# Patient Record
Sex: FEMALE | Race: BLACK OR AFRICAN AMERICAN
[De-identification: names, ages, dates, MRNs, and addresses within clinical notes are randomized per-mention and may not be internally consistent; named-entity substitution may affect disease eponyms.]

---

## 2017-04-14 ENCOUNTER — ASOB RESULT (OUTPATIENT)
Age: 37
End: 2017-04-14

## 2017-04-14 ENCOUNTER — APPOINTMENT (OUTPATIENT)
Dept: ANTEPARTUM | Facility: CLINIC | Age: 37
End: 2017-04-14

## 2017-04-14 PROBLEM — Z00.00 ENCOUNTER FOR PREVENTIVE HEALTH EXAMINATION: Status: ACTIVE | Noted: 2017-04-14

## 2017-05-10 ENCOUNTER — APPOINTMENT (OUTPATIENT)
Dept: ANTEPARTUM | Facility: CLINIC | Age: 37
End: 2017-05-10

## 2017-05-10 ENCOUNTER — ASOB RESULT (OUTPATIENT)
Age: 37
End: 2017-05-10

## 2017-05-30 ENCOUNTER — ASOB RESULT (OUTPATIENT)
Age: 37
End: 2017-05-30

## 2017-05-30 ENCOUNTER — APPOINTMENT (OUTPATIENT)
Dept: ANTEPARTUM | Facility: CLINIC | Age: 37
End: 2017-05-30

## 2017-07-07 ENCOUNTER — OUTPATIENT (OUTPATIENT)
Dept: OUTPATIENT SERVICES | Age: 37
LOS: 1 days | Discharge: HOME | End: 2017-07-07

## 2017-07-10 ENCOUNTER — APPOINTMENT (OUTPATIENT)
Dept: PEDIATRIC CARDIOLOGY | Facility: CLINIC | Age: 37
End: 2017-07-10

## 2017-09-05 ENCOUNTER — OUTPATIENT (OUTPATIENT)
Dept: OUTPATIENT SERVICES | Facility: HOSPITAL | Age: 37
LOS: 1 days | End: 2017-09-05

## 2017-09-05 DIAGNOSIS — O30.039 TWIN PREGNANCY, MONOCHORIONIC/DIAMNIOTIC, UNSPECIFIED TRIMESTER: ICD-10-CM

## 2017-09-05 LAB
APPEARANCE UR: SIGNIFICANT CHANGE UP
BACTERIA # UR AUTO: SIGNIFICANT CHANGE UP
BILIRUB UR-MCNC: NEGATIVE — SIGNIFICANT CHANGE UP
BLD GP AB SCN SERPL QL: NEGATIVE — SIGNIFICANT CHANGE UP
BLOOD UR QL VISUAL: NEGATIVE — SIGNIFICANT CHANGE UP
COLOR SPEC: SIGNIFICANT CHANGE UP
GLUCOSE UR-MCNC: NEGATIVE — SIGNIFICANT CHANGE UP
HCT VFR BLD CALC: 34.2 % — LOW (ref 34.5–45)
HGB BLD-MCNC: 11.7 G/DL — SIGNIFICANT CHANGE UP (ref 11.5–15.5)
KETONES UR-MCNC: NEGATIVE — SIGNIFICANT CHANGE UP
LEUKOCYTE ESTERASE UR-ACNC: HIGH
MCHC RBC-ENTMCNC: 33.1 PG — SIGNIFICANT CHANGE UP (ref 27–34)
MCHC RBC-ENTMCNC: 34.2 % — SIGNIFICANT CHANGE UP (ref 32–36)
MCV RBC AUTO: 96.6 FL — SIGNIFICANT CHANGE UP (ref 80–100)
MUCOUS THREADS # UR AUTO: SIGNIFICANT CHANGE UP
NITRITE UR-MCNC: NEGATIVE — SIGNIFICANT CHANGE UP
NRBC # FLD: 0 — SIGNIFICANT CHANGE UP
PH UR: 7 — SIGNIFICANT CHANGE UP (ref 4.6–8)
PLATELET # BLD AUTO: 153 K/UL — SIGNIFICANT CHANGE UP (ref 150–400)
PMV BLD: 11.4 FL — SIGNIFICANT CHANGE UP (ref 7–13)
PROT UR-MCNC: NEGATIVE — SIGNIFICANT CHANGE UP
RBC # BLD: 3.54 M/UL — LOW (ref 3.8–5.2)
RBC # FLD: 13.8 % — SIGNIFICANT CHANGE UP (ref 10.3–14.5)
RBC CASTS # UR COMP ASSIST: SIGNIFICANT CHANGE UP (ref 0–?)
RH IG SCN BLD-IMP: POSITIVE — SIGNIFICANT CHANGE UP
SP GR SPEC: 1 — SIGNIFICANT CHANGE UP (ref 1–1.03)
SQUAMOUS # UR AUTO: SIGNIFICANT CHANGE UP
UROBILINOGEN FLD QL: NORMAL E.U. — SIGNIFICANT CHANGE UP (ref 0.1–0.2)
WBC # BLD: 6.11 K/UL — SIGNIFICANT CHANGE UP (ref 3.8–10.5)
WBC # FLD AUTO: 6.11 K/UL — SIGNIFICANT CHANGE UP (ref 3.8–10.5)
WBC UR QL: HIGH (ref 0–?)

## 2017-09-05 RX ORDER — METOCLOPRAMIDE HCL 10 MG
10 TABLET ORAL ONCE
Qty: 0 | Refills: 0 | Status: COMPLETED | OUTPATIENT
Start: 2017-09-21 | End: 2017-09-21

## 2017-09-05 RX ORDER — SODIUM CHLORIDE 9 MG/ML
1000 INJECTION, SOLUTION INTRAVENOUS ONCE
Qty: 0 | Refills: 0 | Status: COMPLETED | OUTPATIENT
Start: 2017-09-21 | End: 2017-09-21

## 2017-09-06 LAB — T PALLIDUM AB TITR SER: NEGATIVE — SIGNIFICANT CHANGE UP

## 2017-09-10 ENCOUNTER — EMERGENCY (EMERGENCY)
Facility: HOSPITAL | Age: 37
LOS: 1 days | Discharge: STILL ADMITTED OR ADMINISTRATIVE | End: 2017-09-10
Admitting: EMERGENCY MEDICINE

## 2017-09-10 ENCOUNTER — OUTPATIENT (OUTPATIENT)
Dept: OUTPATIENT SERVICES | Facility: HOSPITAL | Age: 37
LOS: 1 days | End: 2017-09-10

## 2017-09-10 VITALS
OXYGEN SATURATION: 100 % | SYSTOLIC BLOOD PRESSURE: 120 MMHG | RESPIRATION RATE: 18 BRPM | TEMPERATURE: 98 F | HEART RATE: 88 BPM | DIASTOLIC BLOOD PRESSURE: 59 MMHG

## 2017-09-10 DIAGNOSIS — O26.899 OTHER SPECIFIED PREGNANCY RELATED CONDITIONS, UNSPECIFIED TRIMESTER: ICD-10-CM

## 2017-09-10 DIAGNOSIS — Z3A.00 WEEKS OF GESTATION OF PREGNANCY NOT SPECIFIED: ICD-10-CM

## 2017-09-10 NOTE — ED ADULT TRIAGE NOTE - CHIEF COMPLAINT QUOTE
34weeks pregnant, , Pt sent in by WILLA Galaviz for low FHR. Pt denies any med complaints at this time.

## 2017-09-14 ENCOUNTER — OUTPATIENT (OUTPATIENT)
Dept: OUTPATIENT SERVICES | Facility: HOSPITAL | Age: 37
LOS: 1 days | End: 2017-09-14

## 2017-09-14 ENCOUNTER — EMERGENCY (EMERGENCY)
Facility: HOSPITAL | Age: 37
LOS: 1 days | Discharge: STILL ADMITTED OR ADMINISTRATIVE | End: 2017-09-14
Admitting: EMERGENCY MEDICINE

## 2017-09-14 VITALS
DIASTOLIC BLOOD PRESSURE: 84 MMHG | RESPIRATION RATE: 16 BRPM | SYSTOLIC BLOOD PRESSURE: 127 MMHG | HEART RATE: 90 BPM | TEMPERATURE: 209 F | OXYGEN SATURATION: 100 %

## 2017-09-14 DIAGNOSIS — O26.899 OTHER SPECIFIED PREGNANCY RELATED CONDITIONS, UNSPECIFIED TRIMESTER: ICD-10-CM

## 2017-09-14 DIAGNOSIS — Z3A.00 WEEKS OF GESTATION OF PREGNANCY NOT SPECIFIED: ICD-10-CM

## 2017-09-14 NOTE — ED ADULT TRIAGE NOTE - CHIEF COMPLAINT QUOTE
Sent from OB for discrepancy in baby's weight. Pt. is 25wks pregnant with twins. Denies any abdominal pain/cramping, vaginal bleeding or any other complaints in triage. L+D called and pt. transferred to unit. Pt. due 10/17.

## 2017-09-15 RX ORDER — SODIUM CHLORIDE 9 MG/ML
1000 INJECTION, SOLUTION INTRAVENOUS
Qty: 0 | Refills: 0 | Status: DISCONTINUED | OUTPATIENT
Start: 2017-09-15 | End: 2017-09-29

## 2017-09-21 ENCOUNTER — RESULT REVIEW (OUTPATIENT)
Age: 37
End: 2017-09-21

## 2017-09-21 ENCOUNTER — INPATIENT (INPATIENT)
Facility: HOSPITAL | Age: 37
LOS: 2 days | Discharge: HOME | End: 2017-09-24
Attending: OBSTETRICS & GYNECOLOGY | Admitting: OBSTETRICS & GYNECOLOGY
Payer: MEDICAID

## 2017-09-21 VITALS — WEIGHT: 182.98 LBS | HEIGHT: 65 IN

## 2017-09-21 DIAGNOSIS — O30.039 TWIN PREGNANCY, MONOCHORIONIC/DIAMNIOTIC, UNSPECIFIED TRIMESTER: ICD-10-CM

## 2017-09-21 LAB
BLD GP AB SCN SERPL QL: NEGATIVE — SIGNIFICANT CHANGE UP
RH IG SCN BLD-IMP: POSITIVE — SIGNIFICANT CHANGE UP

## 2017-09-21 PROCEDURE — 88307 TISSUE EXAM BY PATHOLOGIST: CPT | Mod: 26

## 2017-09-21 RX ORDER — IBUPROFEN 200 MG
600 TABLET ORAL EVERY 6 HOURS
Qty: 0 | Refills: 0 | Status: COMPLETED | OUTPATIENT
Start: 2017-09-21 | End: 2018-08-20

## 2017-09-21 RX ORDER — HYDROMORPHONE HYDROCHLORIDE 2 MG/ML
1 INJECTION INTRAMUSCULAR; INTRAVENOUS; SUBCUTANEOUS EVERY 6 HOURS
Qty: 0 | Refills: 0 | Status: DISCONTINUED | OUTPATIENT
Start: 2017-09-21 | End: 2017-09-22

## 2017-09-21 RX ORDER — HEPARIN SODIUM 5000 [USP'U]/ML
5000 INJECTION INTRAVENOUS; SUBCUTANEOUS EVERY 12 HOURS
Qty: 0 | Refills: 0 | Status: DISCONTINUED | OUTPATIENT
Start: 2017-09-21 | End: 2017-09-24

## 2017-09-21 RX ORDER — DIPHENHYDRAMINE HCL 50 MG
25 CAPSULE ORAL EVERY 6 HOURS
Qty: 0 | Refills: 0 | Status: DISCONTINUED | OUTPATIENT
Start: 2017-09-21 | End: 2017-09-24

## 2017-09-21 RX ORDER — FAMOTIDINE 10 MG/ML
20 INJECTION INTRAVENOUS ONCE
Qty: 0 | Refills: 0 | Status: COMPLETED | OUTPATIENT
Start: 2017-09-21 | End: 2017-09-21

## 2017-09-21 RX ORDER — ONDANSETRON 8 MG/1
4 TABLET, FILM COATED ORAL EVERY 6 HOURS
Qty: 0 | Refills: 0 | Status: DISCONTINUED | OUTPATIENT
Start: 2017-09-21 | End: 2017-09-22

## 2017-09-21 RX ORDER — KETOROLAC TROMETHAMINE 30 MG/ML
30 SYRINGE (ML) INJECTION EVERY 6 HOURS
Qty: 0 | Refills: 0 | Status: DISCONTINUED | OUTPATIENT
Start: 2017-09-21 | End: 2017-09-22

## 2017-09-21 RX ORDER — ACETAMINOPHEN 500 MG
975 TABLET ORAL EVERY 6 HOURS
Qty: 0 | Refills: 0 | Status: DISCONTINUED | OUTPATIENT
Start: 2017-09-21 | End: 2017-09-24

## 2017-09-21 RX ORDER — SODIUM CHLORIDE 9 MG/ML
1000 INJECTION, SOLUTION INTRAVENOUS
Qty: 0 | Refills: 0 | Status: DISCONTINUED | OUTPATIENT
Start: 2017-09-21 | End: 2017-09-22

## 2017-09-21 RX ORDER — MORPHINE SULFATE 50 MG/1
0.15 CAPSULE, EXTENDED RELEASE ORAL ONCE
Qty: 0 | Refills: 0 | Status: DISCONTINUED | OUTPATIENT
Start: 2017-09-21 | End: 2017-09-22

## 2017-09-21 RX ORDER — FERROUS SULFATE 325(65) MG
325 TABLET ORAL DAILY
Qty: 0 | Refills: 0 | Status: DISCONTINUED | OUTPATIENT
Start: 2017-09-21 | End: 2017-09-24

## 2017-09-21 RX ORDER — SODIUM CHLORIDE 9 MG/ML
1000 INJECTION, SOLUTION INTRAVENOUS
Qty: 0 | Refills: 0 | Status: DISCONTINUED | OUTPATIENT
Start: 2017-09-21 | End: 2017-09-21

## 2017-09-21 RX ORDER — CITRIC ACID/SODIUM CITRATE 300-500 MG
30 SOLUTION, ORAL ORAL ONCE
Qty: 0 | Refills: 0 | Status: COMPLETED | OUTPATIENT
Start: 2017-09-21 | End: 2017-09-21

## 2017-09-21 RX ORDER — OXYCODONE HYDROCHLORIDE 5 MG/1
5 TABLET ORAL
Qty: 0 | Refills: 0 | Status: COMPLETED | OUTPATIENT
Start: 2017-09-21 | End: 2017-09-28

## 2017-09-21 RX ORDER — DOCUSATE SODIUM 100 MG
100 CAPSULE ORAL
Qty: 0 | Refills: 0 | Status: DISCONTINUED | OUTPATIENT
Start: 2017-09-21 | End: 2017-09-24

## 2017-09-21 RX ORDER — INFLUENZA VIRUS VACCINE 15; 15; 15; 15 UG/.5ML; UG/.5ML; UG/.5ML; UG/.5ML
0.5 SUSPENSION INTRAMUSCULAR ONCE
Qty: 0 | Refills: 0 | Status: DISCONTINUED | OUTPATIENT
Start: 2017-09-21 | End: 2017-09-24

## 2017-09-21 RX ORDER — OXYTOCIN 10 UNIT/ML
41.67 VIAL (ML) INJECTION
Qty: 20 | Refills: 0 | Status: DISCONTINUED | OUTPATIENT
Start: 2017-09-21 | End: 2017-09-22

## 2017-09-21 RX ORDER — NALOXONE HYDROCHLORIDE 4 MG/.1ML
0.1 SPRAY NASAL
Qty: 0 | Refills: 0 | Status: DISCONTINUED | OUTPATIENT
Start: 2017-09-21 | End: 2017-09-22

## 2017-09-21 RX ORDER — OXYCODONE HYDROCHLORIDE 5 MG/1
10 TABLET ORAL
Qty: 0 | Refills: 0 | Status: DISCONTINUED | OUTPATIENT
Start: 2017-09-21 | End: 2017-09-22

## 2017-09-21 RX ORDER — OXYCODONE HYDROCHLORIDE 5 MG/1
5 TABLET ORAL EVERY 4 HOURS
Qty: 0 | Refills: 0 | Status: COMPLETED | OUTPATIENT
Start: 2017-09-21 | End: 2017-09-28

## 2017-09-21 RX ORDER — OXYTOCIN 10 UNIT/ML
333.33 VIAL (ML) INJECTION
Qty: 20 | Refills: 0 | Status: DISCONTINUED | OUTPATIENT
Start: 2017-09-21 | End: 2017-09-21

## 2017-09-21 RX ORDER — SIMETHICONE 80 MG/1
80 TABLET, CHEWABLE ORAL EVERY 4 HOURS
Qty: 0 | Refills: 0 | Status: DISCONTINUED | OUTPATIENT
Start: 2017-09-21 | End: 2017-09-24

## 2017-09-21 RX ORDER — OXYCODONE HYDROCHLORIDE 5 MG/1
5 TABLET ORAL
Qty: 0 | Refills: 0 | Status: DISCONTINUED | OUTPATIENT
Start: 2017-09-21 | End: 2017-09-22

## 2017-09-21 RX ORDER — LANOLIN
1 OINTMENT (GRAM) TOPICAL
Qty: 0 | Refills: 0 | Status: DISCONTINUED | OUTPATIENT
Start: 2017-09-21 | End: 2017-09-24

## 2017-09-21 RX ORDER — TETANUS TOXOID, REDUCED DIPHTHERIA TOXOID AND ACELLULAR PERTUSSIS VACCINE, ADSORBED 5; 2.5; 8; 8; 2.5 [IU]/.5ML; [IU]/.5ML; UG/.5ML; UG/.5ML; UG/.5ML
0.5 SUSPENSION INTRAMUSCULAR ONCE
Qty: 0 | Refills: 0 | Status: DISCONTINUED | OUTPATIENT
Start: 2017-09-21 | End: 2017-09-24

## 2017-09-21 RX ORDER — GLYCERIN ADULT
1 SUPPOSITORY, RECTAL RECTAL AT BEDTIME
Qty: 0 | Refills: 0 | Status: DISCONTINUED | OUTPATIENT
Start: 2017-09-21 | End: 2017-09-24

## 2017-09-21 RX ADMIN — Medication 10 MILLIGRAM(S): at 09:36

## 2017-09-21 RX ADMIN — HEPARIN SODIUM 5000 UNIT(S): 5000 INJECTION INTRAVENOUS; SUBCUTANEOUS at 18:24

## 2017-09-21 RX ADMIN — SODIUM CHLORIDE 2000 MILLILITER(S): 9 INJECTION, SOLUTION INTRAVENOUS at 09:37

## 2017-09-21 RX ADMIN — Medication 125 MILLIUNIT(S)/MIN: at 13:16

## 2017-09-21 RX ADMIN — ONDANSETRON 4 MILLIGRAM(S): 8 TABLET, FILM COATED ORAL at 18:24

## 2017-09-21 RX ADMIN — Medication 30 MILLILITER(S): at 09:37

## 2017-09-21 RX ADMIN — FAMOTIDINE 20 MILLIGRAM(S): 10 INJECTION INTRAVENOUS at 09:36

## 2017-09-21 RX ADMIN — Medication 30 MILLIGRAM(S): at 18:24

## 2017-09-21 NOTE — DISCHARGE NOTE OB - CARE PROVIDER_API CALL
Jennifer Salvador (DO), Gynecology Obstetrics  Gynecology  [address] [place] [address]  Phone: [phone]  Fax: [phone]

## 2017-09-21 NOTE — DISCHARGE NOTE OB - HOSPITAL COURSE
Scheduled repeat C/S for mono-di twin gestation   Viable female infant apgars 9/9  Viable female infant apgars 9/9

## 2017-09-21 NOTE — DISCHARGE NOTE OB - CARE PLAN
Principal Discharge DX:	 delivery delivered  Goal:	Routine  Instructions for follow-up, activity and diet:	Regular diet; Resume activity as tolerated

## 2017-09-21 NOTE — DISCHARGE NOTE OB - PATIENT PORTAL LINK FT
“You can access the Follow[employer] Patient Portal, offered by [facility], by registering with the following website: [email or web address]”

## 2017-09-22 LAB
BASOPHILS # BLD AUTO: 0.04 K/UL — SIGNIFICANT CHANGE UP (ref 0–0.2)
BASOPHILS NFR BLD AUTO: 0.5 % — SIGNIFICANT CHANGE UP (ref 0–2)
EOSINOPHIL # BLD AUTO: 0.07 K/UL — SIGNIFICANT CHANGE UP (ref 0–0.5)
EOSINOPHIL NFR BLD AUTO: 0.9 % — SIGNIFICANT CHANGE UP (ref 0–6)
HCT VFR BLD CALC: 33.2 % — LOW (ref 34.5–45)
HGB BLD-MCNC: 11.3 G/DL — LOW (ref 11.5–15.5)
IMM GRANULOCYTES # BLD AUTO: 0.06 # — SIGNIFICANT CHANGE UP
IMM GRANULOCYTES NFR BLD AUTO: 0.8 % — SIGNIFICANT CHANGE UP (ref 0–1.5)
LYMPHOCYTES # BLD AUTO: 1.39 K/UL — SIGNIFICANT CHANGE UP (ref 1–3.3)
LYMPHOCYTES # BLD AUTO: 17.7 % — SIGNIFICANT CHANGE UP (ref 13–44)
MCHC RBC-ENTMCNC: 32.8 PG — SIGNIFICANT CHANGE UP (ref 27–34)
MCHC RBC-ENTMCNC: 34 % — SIGNIFICANT CHANGE UP (ref 32–36)
MCV RBC AUTO: 96.2 FL — SIGNIFICANT CHANGE UP (ref 80–100)
MONOCYTES # BLD AUTO: 0.64 K/UL — SIGNIFICANT CHANGE UP (ref 0–0.9)
MONOCYTES NFR BLD AUTO: 8.1 % — SIGNIFICANT CHANGE UP (ref 2–14)
NEUTROPHILS # BLD AUTO: 5.67 K/UL — SIGNIFICANT CHANGE UP (ref 1.8–7.4)
NEUTROPHILS NFR BLD AUTO: 72 % — SIGNIFICANT CHANGE UP (ref 43–77)
NRBC # FLD: 0 — SIGNIFICANT CHANGE UP
PLATELET # BLD AUTO: 134 K/UL — LOW (ref 150–400)
PMV BLD: 11.1 FL — SIGNIFICANT CHANGE UP (ref 7–13)
RBC # BLD: 3.45 M/UL — LOW (ref 3.8–5.2)
RBC # FLD: 13.4 % — SIGNIFICANT CHANGE UP (ref 10.3–14.5)
WBC # BLD: 7.87 K/UL — SIGNIFICANT CHANGE UP (ref 3.8–10.5)
WBC # FLD AUTO: 7.87 K/UL — SIGNIFICANT CHANGE UP (ref 3.8–10.5)

## 2017-09-22 RX ORDER — MAGNESIUM HYDROXIDE 400 MG/1
30 TABLET, CHEWABLE ORAL DAILY
Qty: 0 | Refills: 0 | Status: DISCONTINUED | OUTPATIENT
Start: 2017-09-22 | End: 2017-09-24

## 2017-09-22 RX ORDER — IBUPROFEN 200 MG
600 TABLET ORAL EVERY 6 HOURS
Qty: 0 | Refills: 0 | Status: DISCONTINUED | OUTPATIENT
Start: 2017-09-22 | End: 2017-09-24

## 2017-09-22 RX ADMIN — Medication 1 TABLET(S): at 12:56

## 2017-09-22 RX ADMIN — Medication 975 MILLIGRAM(S): at 06:15

## 2017-09-22 RX ADMIN — Medication 975 MILLIGRAM(S): at 21:32

## 2017-09-22 RX ADMIN — Medication 325 MILLIGRAM(S): at 12:56

## 2017-09-22 RX ADMIN — Medication 30 MILLIGRAM(S): at 06:15

## 2017-09-22 RX ADMIN — MAGNESIUM HYDROXIDE 30 MILLILITER(S): 400 TABLET, CHEWABLE ORAL at 17:20

## 2017-09-22 RX ADMIN — Medication 975 MILLIGRAM(S): at 12:56

## 2017-09-22 RX ADMIN — Medication 975 MILLIGRAM(S): at 22:00

## 2017-09-22 RX ADMIN — HEPARIN SODIUM 5000 UNIT(S): 5000 INJECTION INTRAVENOUS; SUBCUTANEOUS at 05:45

## 2017-09-22 RX ADMIN — Medication 600 MILLIGRAM(S): at 21:32

## 2017-09-22 RX ADMIN — Medication 100 MILLIGRAM(S): at 12:56

## 2017-09-22 RX ADMIN — HEPARIN SODIUM 5000 UNIT(S): 5000 INJECTION INTRAVENOUS; SUBCUTANEOUS at 17:34

## 2017-09-22 RX ADMIN — Medication 30 MILLIGRAM(S): at 05:46

## 2017-09-22 RX ADMIN — SIMETHICONE 80 MILLIGRAM(S): 80 TABLET, CHEWABLE ORAL at 12:56

## 2017-09-22 RX ADMIN — Medication 600 MILLIGRAM(S): at 22:00

## 2017-09-22 RX ADMIN — Medication 25 MILLIGRAM(S): at 05:46

## 2017-09-22 RX ADMIN — Medication 975 MILLIGRAM(S): at 13:26

## 2017-09-22 RX ADMIN — Medication 975 MILLIGRAM(S): at 05:45

## 2017-09-22 NOTE — PROGRESS NOTE ADULT - PROBLEM SELECTOR PLAN 1
-Encourage Ambulation  -Continue with regular diet  -Heparin, SCDs, and ambulation for DVT ppx  -Discontinue grady  -Analgesia as needed

## 2017-09-23 ENCOUNTER — TRANSCRIPTION ENCOUNTER (OUTPATIENT)
Age: 37
End: 2017-09-23

## 2017-09-23 RX ORDER — OXYCODONE HYDROCHLORIDE 5 MG/1
5 TABLET ORAL EVERY 4 HOURS
Qty: 0 | Refills: 0 | Status: DISCONTINUED | OUTPATIENT
Start: 2017-09-23 | End: 2017-09-24

## 2017-09-23 RX ORDER — OXYCODONE HYDROCHLORIDE 5 MG/1
5 TABLET ORAL
Qty: 0 | Refills: 0 | Status: DISCONTINUED | OUTPATIENT
Start: 2017-09-23 | End: 2017-09-24

## 2017-09-23 RX ADMIN — Medication 600 MILLIGRAM(S): at 05:35

## 2017-09-23 RX ADMIN — Medication 600 MILLIGRAM(S): at 12:26

## 2017-09-23 RX ADMIN — Medication 975 MILLIGRAM(S): at 13:16

## 2017-09-23 RX ADMIN — HEPARIN SODIUM 5000 UNIT(S): 5000 INJECTION INTRAVENOUS; SUBCUTANEOUS at 18:46

## 2017-09-23 RX ADMIN — Medication 975 MILLIGRAM(S): at 19:03

## 2017-09-23 RX ADMIN — Medication 600 MILLIGRAM(S): at 06:05

## 2017-09-23 RX ADMIN — OXYCODONE HYDROCHLORIDE 5 MILLIGRAM(S): 5 TABLET ORAL at 16:10

## 2017-09-23 RX ADMIN — Medication 600 MILLIGRAM(S): at 19:03

## 2017-09-23 RX ADMIN — Medication 100 MILLIGRAM(S): at 12:26

## 2017-09-23 RX ADMIN — SIMETHICONE 80 MILLIGRAM(S): 80 TABLET, CHEWABLE ORAL at 15:33

## 2017-09-23 RX ADMIN — OXYCODONE HYDROCHLORIDE 5 MILLIGRAM(S): 5 TABLET ORAL at 15:29

## 2017-09-23 RX ADMIN — Medication 325 MILLIGRAM(S): at 12:26

## 2017-09-23 RX ADMIN — SIMETHICONE 80 MILLIGRAM(S): 80 TABLET, CHEWABLE ORAL at 18:47

## 2017-09-23 RX ADMIN — Medication 975 MILLIGRAM(S): at 05:35

## 2017-09-23 RX ADMIN — Medication 975 MILLIGRAM(S): at 18:47

## 2017-09-23 RX ADMIN — Medication 975 MILLIGRAM(S): at 06:05

## 2017-09-23 RX ADMIN — Medication 600 MILLIGRAM(S): at 13:16

## 2017-09-23 RX ADMIN — Medication 600 MILLIGRAM(S): at 18:47

## 2017-09-23 RX ADMIN — Medication 1 TABLET(S): at 12:26

## 2017-09-23 RX ADMIN — Medication 975 MILLIGRAM(S): at 12:26

## 2017-09-23 RX ADMIN — SIMETHICONE 80 MILLIGRAM(S): 80 TABLET, CHEWABLE ORAL at 11:19

## 2017-09-23 RX ADMIN — HEPARIN SODIUM 5000 UNIT(S): 5000 INJECTION INTRAVENOUS; SUBCUTANEOUS at 05:35

## 2017-09-23 NOTE — PROGRESS NOTE ADULT - PROBLEM SELECTOR PLAN 1
- Continue regular diet.  - Increase ambulation.  - Continue motrin, tylenol, oxycodone PRN for pain control. vs. D/c PCEA today and transition to PO pain medication with motrin, tylenol and oxycodone PRN.     Sherwin Bender PGY-1

## 2017-09-24 VITALS
DIASTOLIC BLOOD PRESSURE: 76 MMHG | SYSTOLIC BLOOD PRESSURE: 130 MMHG | HEART RATE: 64 BPM | OXYGEN SATURATION: 100 % | RESPIRATION RATE: 18 BRPM | TEMPERATURE: 98 F

## 2017-09-24 RX ADMIN — HEPARIN SODIUM 5000 UNIT(S): 5000 INJECTION INTRAVENOUS; SUBCUTANEOUS at 06:01

## 2017-09-24 RX ADMIN — Medication 1 TABLET(S): at 12:59

## 2017-09-24 RX ADMIN — Medication 325 MILLIGRAM(S): at 12:59

## 2017-09-24 RX ADMIN — Medication 975 MILLIGRAM(S): at 00:21

## 2017-09-24 RX ADMIN — Medication 975 MILLIGRAM(S): at 01:30

## 2017-09-24 RX ADMIN — Medication 975 MILLIGRAM(S): at 06:01

## 2017-09-24 RX ADMIN — Medication 600 MILLIGRAM(S): at 01:30

## 2017-09-24 RX ADMIN — Medication 600 MILLIGRAM(S): at 07:30

## 2017-09-24 RX ADMIN — Medication 600 MILLIGRAM(S): at 00:21

## 2017-09-24 RX ADMIN — Medication 600 MILLIGRAM(S): at 06:01

## 2017-09-24 RX ADMIN — Medication 975 MILLIGRAM(S): at 07:30

## 2017-09-24 NOTE — PROGRESS NOTE ADULT - PROBLEM SELECTOR PLAN 1
-Encourage ambulation  -Check CBC  -Oral pain meds for pain control  -HSQ for DVT ppx  -Advance diet as tolerated    Soni Huff PGY-2

## 2017-09-24 NOTE — PROGRESS NOTE ADULT - SUBJECTIVE AND OBJECTIVE BOX
Day ___1 of Anesthesia Pain Management Service    SUBJECTIVE:  Pain Scale Score	At rest: __none_ 	With Activity: __mild_ 	[x ] Refer to charted pain scores    THERAPY:    s/p _____0.2___ mg PF morphine on _____9/2117_    OBJECTIVE:    Sedation Score:	[ x] Alert	[ ] Drowsy	[ ] Arousable	[ ] Asleep	[ ] Unresponsive    Side Effects:	[x ] None	[ ] Nausea	[ ] Vomiting	[ ] Pruritus  		  [ ] Weakness		[ ] Numbness	[ ] Other:    Vital Signs Last 24 Hrs  T(C): 36.2 (22 Sep 2017 05:51), Max: 37.2 (21 Sep 2017 21:51)  T(F): 97.2 (22 Sep 2017 05:51), Max: 99 (21 Sep 2017 21:51)  HR: 66 (22 Sep 2017 05:51) (58 - 95)  BP: 119/52 (22 Sep 2017 05:51) (92/71 - 135/54)  BP(mean): 71 (21 Sep 2017 15:00) (63 - 79)  RR: 17 (22 Sep 2017 05:51) (17 - 26)  SpO2: 99% (22 Sep 2017 05:51) (96% - 100%)    ASSESSMENT/ PLAN  [x ] Patient transitioned to prn analgesics  [ x] Pain management per primary service, pain service to sign off   [ x]Documentation and Verification of current medications     Comments: No anesthetic complications.
OB Progress Note: LTCS, POD#2    S: 37yo POD#2 s/p LTCS. Pain is well controlled. She is tolerating a regular diet and passing flatus. She is voiding spontaneously, and ambulating without difficulty. Denies CP/SOB. Denies lightheadedness/dizziness. Denies N/V.    O:  Vitals:  Vital Signs Last 24 Hrs  T(C): 36.7 (23 Sep 2017 06:50), Max: 37 (22 Sep 2017 10:45)  T(F): 98.1 (23 Sep 2017 06:50), Max: 98.6 (22 Sep 2017 10:45)  HR: 69 (23 Sep 2017 06:50) (69 - 81)  BP: 127/71 (23 Sep 2017 06:50) (119/62 - 128/70)  BP(mean): --  RR: 18 (23 Sep 2017 06:50) (18 - 18)  SpO2: 100% (23 Sep 2017 06:50) (99% - 100%)    MEDICATIONS  (STANDING):  influenza   Vaccine 0.5 milliLiter(s) IntraMuscular once  acetaminophen   Tablet. 975 milliGRAM(s) Oral every 6 hours  diphtheria/tetanus/pertussis (acellular) Vaccine (ADAcel) 0.5 milliLiter(s) IntraMuscular once  ferrous    sulfate 325 milliGRAM(s) Oral daily  prenatal multivitamin 1 Tablet(s) Oral daily  heparin  Injectable 5000 Unit(s) SubCutaneous every 12 hours  ibuprofen  Tablet 600 milliGRAM(s) Oral every 6 hours  oxyCODONE    IR 5 milliGRAM(s) Oral every 3 hours      MEDICATIONS  (PRN):  simethicone 80 milliGRAM(s) Chew every 4 hours PRN Gas  diphenhydrAMINE   Capsule 25 milliGRAM(s) Oral every 6 hours PRN Itching  glycerin Suppository - Adult 1 Suppository(s) Rectal at bedtime PRN Constipation  docusate sodium 100 milliGRAM(s) Oral two times a day PRN Stool Softening  lanolin Ointment 1 Application(s) Topical every 3 hours PRN Sore Nipples  magnesium hydroxide Suspension 30 milliLiter(s) Oral daily PRN Constipation  oxyCODONE    IR 5 milliGRAM(s) Oral every 4 hours PRN Severe Pain (7 - 10)      Labs:  Blood type: O Positive  Rubella IgG: RPR: Negative                          11.3<L>   7.87 >-----------< 134<L>    ( 09-22 @ 06:15 )             33.2<L>            PE:  General: NAD  Abdomen: Soft, appropriately tender, incision c/d/i.  Extremities: No erythema, no pitting edema
Post-Operative Note, C/S  She is a  36y woman who is now post-operative day:     Subjective:  The patient feels well.  She is ambulating.   She is tolerating regular diet.  She denies nausea and vomiting; denies fever.  She is voiding.  Her pain is controlled; incisional pain is appropriate.  She reports normal postpartum bleeding.  She is breastfeeding.  She is formula feeding.    Physical exam:    Vital Signs Last 24 Hrs  T(C): 37 (22 Sep 2017 10:45), Max: 37.2 (21 Sep 2017 21:51)  T(F): 98.6 (22 Sep 2017 10:45), Max: 99 (21 Sep 2017 21:51)  HR: 69 (22 Sep 2017 10:45) (58 - 75)  BP: 119/62 (22 Sep 2017 10:45) (104/52 - 135/54)  BP(mean): 71 (21 Sep 2017 15:00) (64 - 79)  RR: 18 (22 Sep 2017 10:45) (17 - 21)  SpO2: 99% (22 Sep 2017 10:45) (96% - 100%)    Gen: NAD  Breast: Soft, nontender, not engorged.  Abdomen: Soft, nontender, no distension , firm uterine fundus at umbilicus.  Incision: C/D/I.  Pelvic: Normal lochia noted  Ext: No calf tenderness    LABS:                        11.3   7.87  )-----------( 134      ( 22 Sep 2017 06:15 )             33.2       Rubella status:     Allergies    No Known Drug Allergies  peanuts (Anaphylaxis)    Intolerances      MEDICATIONS  (STANDING):  influenza   Vaccine 0.5 milliLiter(s) IntraMuscular once  acetaminophen   Tablet. 975 milliGRAM(s) Oral every 6 hours  ibuprofen  Tablet 600 milliGRAM(s) Oral every 6 hours  oxyCODONE    IR 5 milliGRAM(s) Oral every 3 hours  lactated ringers. 1000 milliLiter(s) (125 mL/Hr) IV Continuous <Continuous>  diphtheria/tetanus/pertussis (acellular) Vaccine (ADAcel) 0.5 milliLiter(s) IntraMuscular once  ferrous    sulfate 325 milliGRAM(s) Oral daily  prenatal multivitamin 1 Tablet(s) Oral daily  heparin  Injectable 5000 Unit(s) SubCutaneous every 12 hours    MEDICATIONS  (PRN):  oxyCODONE    IR 5 milliGRAM(s) Oral every 3 hours PRN Mild Pain  oxyCODONE    IR 10 milliGRAM(s) Oral every 3 hours PRN Moderate Pain  HYDROmorphone  Injectable 1 milliGRAM(s) IV Push every 6 hours PRN Severe Pain  naloxone Injectable 0.1 milliGRAM(s) IV Push every 3 minutes PRN For ANY of the following changes in patient status:  A. RR LESS THAN 10 breaths per minute, B. Oxygen saturation LESS THAN 90%, C. Sedation score of 6  ondansetron Injectable 4 milliGRAM(s) IV Push every 6 hours PRN Nausea  oxyCODONE    IR 5 milliGRAM(s) Oral every 4 hours PRN Severe Pain (7 - 10)  simethicone 80 milliGRAM(s) Chew every 4 hours PRN Gas  diphenhydrAMINE   Capsule 25 milliGRAM(s) Oral every 6 hours PRN Itching  glycerin Suppository - Adult 1 Suppository(s) Rectal at bedtime PRN Constipation  docusate sodium 100 milliGRAM(s) Oral two times a day PRN Stool Softening  lanolin Ointment 1 Application(s) Topical every 3 hours PRN Sore Nipples        Assessment and Plan  POD #1 s/p C/S.  Doing well.  Encourage ambulation.  Incisional care and PO instructions reviewed.  CPC.
Post-Operative Note, C/S  She is a  36y woman who is now post-operative day: 2    Subjective:  The patient feels well.  She is ambulating.   She is tolerating regular diet.  She denies nausea and vomiting; denies fever.  She is voiding.  Her pain is controlled; incisional pain is appropriate.  She reports normal postpartum bleeding.      Physical exam:    Vital Signs Last 24 Hrs  T(C): 36.7 (23 Sep 2017 06:50), Max: 37 (22 Sep 2017 22:03)  T(F): 98.1 (23 Sep 2017 06:50), Max: 98.6 (22 Sep 2017 22:03)  HR: 69 (23 Sep 2017 06:50) (69 - 81)  BP: 127/71 (23 Sep 2017 06:50) (122/68 - 128/70)  BP(mean): --  RR: 18 (23 Sep 2017 06:50) (18 - 18)  SpO2: 100% (23 Sep 2017 06:50) (99% - 100%)    Gen: NAD  Breast: Soft, nontender, not engorged.  Abdomen: Soft, nontender, no distension , firm uterine fundus at umbilicus.  Incision: C/D/I.  Pelvic: Normal lochia noted  Ext: No calf tenderness    LABS:                        11.3   7.87  )-----------( 134      ( 22 Sep 2017 06:15 )             33.2           Allergies    No Known Drug Allergies  peanuts (Anaphylaxis)      MEDICATIONS  (STANDING):  influenza   Vaccine 0.5 milliLiter(s) IntraMuscular once  acetaminophen   Tablet. 975 milliGRAM(s) Oral every 6 hours  diphtheria/tetanus/pertussis (acellular) Vaccine (ADAcel) 0.5 milliLiter(s) IntraMuscular once  ferrous    sulfate 325 milliGRAM(s) Oral daily  prenatal multivitamin 1 Tablet(s) Oral daily  heparin  Injectable 5000 Unit(s) SubCutaneous every 12 hours  ibuprofen  Tablet 600 milliGRAM(s) Oral every 6 hours  oxyCODONE    IR 5 milliGRAM(s) Oral every 3 hours    MEDICATIONS  (PRN):  simethicone 80 milliGRAM(s) Chew every 4 hours PRN Gas  diphenhydrAMINE   Capsule 25 milliGRAM(s) Oral every 6 hours PRN Itching  glycerin Suppository - Adult 1 Suppository(s) Rectal at bedtime PRN Constipation  docusate sodium 100 milliGRAM(s) Oral two times a day PRN Stool Softening  lanolin Ointment 1 Application(s) Topical every 3 hours PRN Sore Nipples  magnesium hydroxide Suspension 30 milliLiter(s) Oral daily PRN Constipation  oxyCODONE    IR 5 milliGRAM(s) Oral every 4 hours PRN Severe Pain (7 - 10)        Assessment and Plan  POD #2 s/p C/S.  Doing well.  Encourage ambulation.  Incisional care and PO instructions reviewed.  CPC.
Postop Day  __1_ s/p   C- Section    THERAPY:  [ x ] Spinal morphine   [  ] Epidural morphine   [  ] IV PCA Hydromorphone 1 mg/ml      Sedation Score:	  [ x ] Alert	    [  ] Drowsy        [  ] Arousable	[  ] Asleep	[  ] Unresponsive    Side Effects:	  [ x ] None	     [  ] Nausea        [  ] Pruritus        [  ] Weakness   [  ] Numbness        ASSESSMENT/ PLAN   [   ] Discontinue         [  ] Continue  [ x ]Documentation and Verification of current medications     Comments: Transitioned to prn oral analgesics by primary team. No anesthetic complication.
Postpartum Note,  Section   36y year old woman post-operative day 1 from uncomplicated repeat LTCS.  No acute events overnight.  Patient has no complaints and pain is well-controlled.  Patient is tolerating regular diet, passing flatus, ambulating, has a grady catheter in place.  Postpartum bleeding is well controlled.    Physical exam:    Vital Signs Last 24 Hrs  T(C): 36.2 (22 Sep 2017 05:51), Max: 37.2 (21 Sep 2017 21:51)  T(F): 97.2 (22 Sep 2017 05:51), Max: 99 (21 Sep 2017 21:51)  HR: 66 (22 Sep 2017 05:51) (58 - 95)  BP: 119/52 (22 Sep 2017 05:51) (92/71 - 135/54)  BP(mean): 71 (21 Sep 2017 15:00) (63 - 79)  RR: 17 (22 Sep 2017 05:51) (17 - 26)  SpO2: 99% (22 Sep 2017 05:51) (96% - 100%)     @ 07:01  -   @ 07:00  --------------------------------------------------------  IN: 2975 mL / OUT: 2835 mL / NET: 140 mL        Gen: NAD  Abdomen: Soft, nontender, no distension , firm uterine fundus at umbilicus.  Incision: Clean, dry, and intact with steri strips  Pelvic: Normal lochia noted  Ext: No calf tenderness    LABS:                        11.3   7.87  )-----------( 134      ( 22 Sep 2017 06:15 )             33.2
Subjective:  The patient feels well. She is ambulating.  She is tolerating regular diet. She denies nausea and vomiting. She is voiding. Her pain is controlled. She reports normal postpartum bleeding. She is breastfeeding.    Objective:    Vital Signs Last 24 Hrs  T(C): 36.5 (24 Sep 2017 06:09), Max: 36.7 (23 Sep 2017 21:44)  T(F): 97.7 (24 Sep 2017 06:09), Max: 98.1 (23 Sep 2017 21:44)  HR: 64 (24 Sep 2017 06:09) (64 - 74)  BP: 130/76 (24 Sep 2017 06:09) (115/55 - 130/76)  BP(mean): --  RR: 18 (24 Sep 2017 06:09) (18 - 18)  SpO2: 100% (24 Sep 2017 06:09) (99% - 100%)        Physical exam:  Gen: NAD  Abdomen: Soft, nontender, no distension. Firm uterine fundus at umbilicus.  Incision: Clean, dry, and intact   Pelvic: Deferred  Ext: No calf tenderness or edema bilaterally          MEDICATIONS  (STANDING):  influenza   Vaccine 0.5 milliLiter(s) IntraMuscular once  acetaminophen   Tablet. 975 milliGRAM(s) Oral every 6 hours  diphtheria/tetanus/pertussis (acellular) Vaccine (ADAcel) 0.5 milliLiter(s) IntraMuscular once  ferrous    sulfate 325 milliGRAM(s) Oral daily  prenatal multivitamin 1 Tablet(s) Oral daily  heparin  Injectable 5000 Unit(s) SubCutaneous every 12 hours  ibuprofen  Tablet 600 milliGRAM(s) Oral every 6 hours  oxyCODONE    IR 5 milliGRAM(s) Oral every 3 hours    MEDICATIONS  (PRN):  simethicone 80 milliGRAM(s) Chew every 4 hours PRN Gas  diphenhydrAMINE   Capsule 25 milliGRAM(s) Oral every 6 hours PRN Itching  glycerin Suppository - Adult 1 Suppository(s) Rectal at bedtime PRN Constipation  docusate sodium 100 milliGRAM(s) Oral two times a day PRN Stool Softening  lanolin Ointment 1 Application(s) Topical every 3 hours PRN Sore Nipples  magnesium hydroxide Suspension 30 milliLiter(s) Oral daily PRN Constipation  oxyCODONE    IR 5 milliGRAM(s) Oral every 4 hours PRN Severe Pain (7 - 10)

## 2017-10-01 LAB — SURGICAL PATHOLOGY STUDY: SIGNIFICANT CHANGE UP

## 2022-04-06 NOTE — ED ADULT NURSE NOTE - NS ED NURSE NOTE DISPO AOU DETAILS FT
OBSTETRIC HISTORY AND PHYSICAL EXAM      PRIMARY CERTIFIED NURSE MIDWIFE:  Nikole Schaefer, CPM, LM  ADMITTING ATTENDING:  Saman Jansen MD  ADMITTING CERTIFIED NURSE MIDWIFE: Nikole Grey CNM    Chief Complaint   Patient presents with   • Scheduled Induction     HISTORY OF PRESENT ILLNESS:  Fernanda Potter is a 30 year old White  female, , estimated date of delivery 3/25/2022, by Ultrasound @ 41w5d who presents for scheduled IOL for post-EDC and oligohydramnios.  There is no prenatal record to review.   Reports:  no complaints.  Fetal movement present.   Pregnancy is significant for routine prenatal care, oligohydramnios and post-EDC.    Active Hospital Problems    Diagnosis    • Encounter for induction of labor    • Supervision of other normal pregnancy, antepartum      Bridge patient- Nikole Schaefer  GBS unknown- declined testing       OB History    Para Term  AB Living   3 1 1   1 1   SAB IAB Ectopic Molar Multiple Live Births             1      # Outcome Date GA Lbr Thomas/2nd Weight Sex Delivery Anes PTL Lv   3 Current            2 AB 21 8w0d    Miscar      1 Term 2017 41w5d  3600 g F Vag-Spont EPI  LOCO      Birth Comments: IOL for post EDC, intramnionitc infection- baby NICU for 48 hours.  GBS negative.       CURRENT PREGNANCY:   Past Medical History:   Diagnosis Date   • Seasonal allergies      Past Surgical History:   Procedure Laterality Date   • Colonoscopy      diagnosed with colitis   • Iud removal  2020    Mirena   • Tonsillectomy and adenoidectomy       I have reviewed the patient's medications and allergies, past medical, surgical, social and family history, updating these as appropriate.  See Histories section of the electronic medical record for a display of this information.     GYNECOLOGIC HISTORY:  Fernanda Potter has no history of sexually transmitted diseases, Denies all sexually transmitted disease's.  She has no history of abnormal Pap smears.       SOCIAL  HISTORY:  Social History     Tobacco Use   • Smoking status: Never Smoker   • Smokeless tobacco: Never Used   Substance Use Topics   • Alcohol use: Yes       Sexually Active: Yes             Partners with: Male       Birth Control/Protection: None      Drug Use:    Never           Review of patient's social economics indicates:                    FAMILY HISTORY:  Family History   Problem Relation Age of Onset   • Bipolar disorder Mother    • Diabetes Mother    • Hypertension Mother    • Other Father         doesn't know her biological father   • Asthma Sister    • Other Sister         Marfan's   • Diabetes Maternal Grandmother    • Other Maternal Grandfather          in his 50s   • Kidney disease Maternal Great-Grandmother    • COPD Maternal Great-Grandmother    • Patient is unaware of any medical problems Daughter        ACTIVE MEDICATIONS:  Outpatient Medications Marked as Taking for the 22 encounter (Hospital Encounter)   Medication Sig Dispense Refill   • Prenatal Vit-Fe Fumarate-FA (PRENATAL VITAMIN PO)          ALLERGIES:  ALLERGIES:  Patient has no known allergies.    REVIEW OF SYSTEMS:    Eye problem(s):  negative  ENT problem(s):  negative  Cardiovascular problem(s):  negative  Respiratory problem(s):  negative  Gastrointestinal problem(s):  negative     Genitourinary problem(s):  negative  Musculoskeletal problem(s):  negative  Integumentary problem(s):  negative  Neurological problem(s):  negative  Psychiatric problem(s):  negative  Endocrine problem(s):  negative  Hematologic and/or Lymphatic problem(s):  negative    PHYSICAL EXAM  Visit Vitals  Temp 98.6 °F (37 °C) (Oral)   Ht 5' 7\" (1.702 m)   BMI 31.58 kg/m²       General:   alert, appears stated age and cooperative,  female   Psychiatric:  Appropriate mood and affect   Skin:   normal   HEENT:  pupils equal, round and reactive to light   Lungs:   Respirations unlabored, clear to auscultation bilaterally   Heart:   regular rate  and rhythm, S1, S2 normal, no murmur, click, rub or gallop   Abdomen: Gravid, nontender, soft, no masses   Extremities Normal   Fetal Surveillance/  Tocodynamometer: Fetal Heart Rate  Mode:   External  Baseline:   140  Classification:   Category I  Variability:   Moderate  Pattern:   Accelerations    Uterine Activity  Mode:   [place]  Frequency:   Irregular  Duration:     Quality:   Mild  Resting Tone:  Soft     Uterine Size: size equals dates   Presentation: cephalic by exam     Sterile Vaginal Exam:    Dilation 3 (22) cm   Effacement 60 (22)%   Station -1 (22)   Consistency Soft (22)   Position  Presentation             PRENATAL LABS:  Blood type:  Ordered    No results for input(s): GCPT, CHPT, HGB, HCT, PLT, HBAG, HIV, RUBEL, SYPIGG, RPR, HSVM, HSV1X, HSV2X, HRTYG2, HSV1R, HSVPRB, REPCS, CULT, GBS in the last 8765 hours.    Invalid input(s): SPYGM, HSV1GR, HSVPCR  No results found for this or any previous visit. No results found for this or any previous visit.    Most Recent Pap Smear:  No results found for: THINHPVRPT  First Trimester/Quad Screen:  Not collected  One-hour glucose tolerance test:  Normal- per patient's report    No results found for this or any previous visit.    IMAGING:  Please see imaging tab for full ultrasound reports  : anatomy:  Normal.  placenta:  Posterior.  MVP:  5.25 AC:  73%. EFW:  81%.  : Cephalic.  MVP: 1.74.  BPP: 8/8.  EFW 4144 gm.    ASSESSMENT/PLAN:  Fernanda Potter is a 30 year old  female at 41w5d here for scheduled IOL.    Fetal Heart Rate Interpretation:   Category I  Estimated Fetal Weight:  9 lb  Postpartum Hemorrhage Risk:  Low (22)    Patient Active Problem List   Diagnosis   • Seasonal allergies   • Depression with anxiety   • Supervision of other normal pregnancy, antepartum   • Encounter for induction of labor          1. Admit to L&D.  2. CBC, Type&Screen.  3. Rapid Covid-19.  4. CMP, Uric Acid, Spot  Urine.   5. IV, LR at 125 cc/hr.  6. Clear liquid diet.   7. Continuous fetal monitoring.   8. Hydrotherapy, Nubain and/or epidural if desired.  9. Pitocin per protocol.  10. Obtain prenatal records to review.     Disposition:  Admit under Dr. Jansen/[facility] cares.  Report to RHODA Bañuelos CNM at sign-out.     Nikole Grey CNM     Sent to L&D as per Keila in L&D triage

## 2022-08-23 NOTE — DISCHARGE NOTE OB - MEDICATION SUMMARY - MEDICATIONS TO CHANGE
I will SWITCH the dose or number of times a day I take the medications listed below when I get home from the hospital:  None
742

## 2022-12-08 NOTE — PATIENT PROFILE OB - POST PARTUM DEPRESSION SCREEN OB 5
12/09/22                            Yanet Mccauley  [address] [place] [address]    To Whom It May Concern:    This is to certify Yanet Mccauley has been under the care of VERÓNICA Valdez for medical and mental health care since July 2022. She has been having struggles with anxiety and depression this fall with frequent titration of medications. In addition, she has been under increased stress caring for her young daughter during recurrent illness. Due to these issues, it has been difficult for her to maintain attention to course work as required to academic success.    If you have any questions or require additional information, please feel free to reach out.       Electronically signed by:  VERÓNICA Mayfield  [facility] [address]  [address] [place] [address]  Dept Phone: [phone]     
no
